# Patient Record
Sex: MALE | Race: WHITE | Employment: OTHER | URBAN - METROPOLITAN AREA
[De-identification: names, ages, dates, MRNs, and addresses within clinical notes are randomized per-mention and may not be internally consistent; named-entity substitution may affect disease eponyms.]

---

## 2017-08-15 ENCOUNTER — HOSPITAL ENCOUNTER (OUTPATIENT)
Dept: PHYSICAL THERAPY | Facility: CLINIC | Age: 67
Setting detail: THERAPIES SERIES
End: 2017-08-15
Attending: ORTHOPAEDIC SURGERY
Payer: MEDICARE

## 2017-08-15 PROCEDURE — G8986 CARRY D/C STATUS: HCPCS | Mod: GP,CK | Performed by: PHYSICAL THERAPIST

## 2017-08-15 PROCEDURE — 97110 THERAPEUTIC EXERCISES: CPT | Mod: GP | Performed by: PHYSICAL THERAPIST

## 2017-08-15 PROCEDURE — G8985 CARRY GOAL STATUS: HCPCS | Mod: GP,CI | Performed by: PHYSICAL THERAPIST

## 2017-08-15 PROCEDURE — 40000718 ZZHC STATISTIC PT DEPARTMENT ORTHO VISIT: Performed by: PHYSICAL THERAPIST

## 2017-08-15 PROCEDURE — 97161 PT EVAL LOW COMPLEX 20 MIN: CPT | Mod: GP | Performed by: PHYSICAL THERAPIST

## 2017-08-15 PROCEDURE — G8984 CARRY CURRENT STATUS: HCPCS | Mod: GP,CK | Performed by: PHYSICAL THERAPIST

## 2017-08-15 NOTE — PROGRESS NOTES
[facility]          OUTPATIENT PHYSICAL THERAPY ORTHOPEDIC EVALUATION  PLAN OF TREATMENT FOR OUTPATIENT REHABILITATION  (COMPLETE FOR INITIAL CLAIMS ONLY)  Patient's Last Name, First Name, M.I.  YOB: 1950  Carlos Cormier    Provider s Name:  [facility]   Medical Record No.  [id number]   Start of Care Date:  08/15/17   Onset Date:  06/20/17   Type:     _X__PT   ___OT   ___SLP Medical Diagnosis:  Right glenohumeral joint arthritis     PT Diagnosis:  Right GH primary impingement    Visits from SOC:  1      _________________________________________________________________________________  Plan of Treatment/Functional Goals:  ADL retraining, joint mobilization, manual therapy, motor coordination training, neuromuscular re-education, ROM, strengthening, stretching     Cryotherapy     Goals     Goal Description: Following PT interventions, patient will be independent with his HEP to reduce future occurrence of pain and disability.  Target Date: 08/15/17       Goal Description: Following PT interventions, patient will have >+5/5 right GH ER strength to reduce impingement with overhead ADL's.  Target Date: 10/10/17       Goal Description: Following PT interventions, patient will be able to sleep for >=5 hours without waking due to right shoulder pain.  Target Date: 10/10/17      Therapy Frequency:  other (see comments) (1 visit for pt to learn HEP; chart to remain open x8 weeks)  Predicted Duration of Therapy Intervention:  8 weeks    Rita Hollingsworth, PT                 I CERTIFY THE NEED FOR THESE SERVICES FURNISHED UNDER        THIS PLAN OF TREATMENT AND WHILE UNDER MY CARE     (Physician co-signature of this document indicates review and certification of the therapy plan).                         Certification Date From:  08/15/17   Certification Date To:  10/10/17    Referring Provider:  Missael Melgar MD    Initial Assessment        See Epic Evaluation Start  of Care Date: 08/15/17

## 2017-08-15 NOTE — PROGRESS NOTES
" 08/15/17 0900   General Information   Type of Visit Initial OP Ortho PT Evaluation   Start of Care Date 08/15/17   Referring Physician Missael Melgar MD   Patient/Family Goals Statement To not have to have surgery; to get more active; to relieve the pain   Orders Evaluate and Treat   Date of Order 08/08/17   Insurance Type Medicare;Other   Insurance Comments/Visits Authorized [employer]; $1980 remaining in PT/SLP cap; no iontophoresis   Medical Diagnosis Right glenohumeral joint arthritis   Surgical/Medical history reviewed Yes   Precautions/Limitations no known precautions/limitations       Present No   Body Part(s)   Body Part(s) Shoulder   Presentation and Etiology   Pertinent history of current problem (include personal factors and/or comorbidities that impact the POC) Patient reports: had his right rotator cuff repaired 10-15 years ago.  Shoulder pain started flaring up in the past 2-3 months.  Thought it was the RTC flaring up again.  Had Xrays which revealed arthritis \"bone on bone.\"  Does feel some pain starting on his left shouder as well.  Has had cortisone shots that don't work anymore.  Hoping to use exercise to avoid surgery.    Impairments A. Pain;D. Decreased ROM;E. Decreased flexibility;F. Decreased strength and endurance;M. Locking or catching   Functional Limitations perform activities of daily living;perform desired leisure / sports activities   Symptom Location Right anterior shoulder   How/Where did it occur From Degenerative Joint Disease   Onset date of current episode/exacerbation 06/20/17   Chronicity Chronic   Pain rating (0-10 point scale) Best (/10);Worst (/10)   Best (/10) 4   Worst (/10) 9   Pain quality A. Sharp;C. Aching   Frequency of pain/symptoms C. With activity   Pain/symptoms are: Worse during the night  (Unable to sleep on side)   Pain/symptoms exacerbated by E. Rest;G. Certain positions;H. Overhead reach   Pain/symptoms eased by E. Changing positions "   Progression of symptoms since onset: Worsened   Prior Level of Function   Prior Level of Function-Mobility Independent   Prior Level of Function-ADLs Independent   Current Level of Function   Current Community Support Family/friend caregiver   Patient role/employment history F. Retired   Living environment [place]   Fall Risk Screen   Fall screen completed by PT   Per patient - Fall 2 or more times in past year? No   Per patient - Fall with injury in past year? No   Is patient a fall risk? No   Functional Scales   Functional Scales Other   Other Scales  SPADI=49.23% disability   Shoulder Objective Findings   Side (if bilateral, select both right and left) Right   Posture Hypertonic upper trap; keo rounded shoulders; increased thoracic kyphosis   Palpation Hypertonic pec minor, upper trap keo   Accessory Motion/Joint Mobility Limited inferior glide right GH   Right Shoulder Flexion AROM Right=110 deg with anterior shoulder pain; left=160 deg   Right Shoulder ER AROM Right=40 deg; Left=60 deg   Right Shoulder IR AROM Right=L5; Left=T10   Right Shoulder Flexion Strength Right=3+/5; Left=4/5   Right Shoulder Abduction Strength Right=3+/5; Left=4/5   Right Shoulder ER Strength Right=3+/5; Left=4/5   Right Shoulder IR Strength Keo=4/5   Right Lower Trapezius Strength Keo=4/5   Planned Therapy Interventions   Planned Therapy Interventions ADL retraining;joint mobilization;manual therapy;motor coordination training;neuromuscular re-education;ROM;strengthening;stretching   Planned Modality Interventions   Planned Modality Interventions Cryotherapy   Clinical Impression   Criteria for Skilled Therapeutic Interventions Met yes, treatment indicated   PT Diagnosis Right GH primary impingement     Influenced by the following impairments Pain; weakness; limited range of motion   Functional limitations due to impairments Pain and difficulty sleeping on right side, difficulty reaching overhead, unable to perform desired gym  routine   Clinical Presentation Stable/Uncomplicated   Clinical Presentation Rationale (+) motivation   (-) chronicity; chronic RTC repair; pt's desire not to return to PT   Clinical Decision Making (Complexity) Low complexity   Therapy Frequency other (see comments)  (1 visit for pt to learn HEP; chart to remain open x8 weeks)   Predicted Duration of Therapy Intervention (days/wks) 8 weeks   Risk & Benefits of therapy have been explained Yes   Patient, Family & other staff in agreement with plan of care Yes   Clinical Impression Comments Anjum is a pleasant 65 yo male who presents today with chronic worsening right shoulder pain.   He presents with signs and symptoms of primary impingement.   Pt does not want to follow up with PT; instead expressed desire to work on his HEP independently.   Pt will benefit from skilled physical therapy to address the above impairments and functional limitations.   Education Assessment   Preferred Learning Style Listening;Demonstration;Pictures/video   Barriers to Learning No barriers   ORTHO GOALS   PT Ortho Eval Goals 1;2;3   Ortho Goal 1   Goal Description Following PT interventions, patient will be independent with his HEP to reduce future occurrence of pain and disability.   Target Date 08/15/17   Date Met 08/15/17   Ortho Goal 2   Goal Description Following PT interventions, patient will have >+5/5 right GH ER strength to reduce impingement with overhead ADL's.   Target Date 10/10/17   Ortho Goal 3   Goal Description Following PT interventions, patient will be able to sleep for >=5 hours without waking due to right shoulder pain.   Target Date 10/10/17   Total Evaluation Time   Total Evaluation Time 15 min   Therapy Certification   Certification date from 08/15/17   Certification date to 10/10/17   Medical Diagnosis Right glenohumeral joint arthritis       Rita Hollingsworth, PT, DPT  Doctor of Physical Therapy #[id number]  [facility]  [facility]  [phone]  [email or web address]

## 2017-08-18 NOTE — ADDENDUM NOTE
Encounter addended by: Missael Melgar MD on: 8/18/2017  1:59 PM<BR>     Actions taken: Cosign clinical note with attestation

## 2018-02-07 NOTE — ADDENDUM NOTE
Encounter addended by: Rita Terrazas, PT on: 2/7/2018  2:02 PM<BR>     Actions taken: Sign clinical note, Flowsheet accepted, Charge Capture section accepted, Episode resolved

## 2018-02-07 NOTE — PROGRESS NOTES
"Outpatient Physical Therapy Discharge Note     Patient: Carlos Cormier  : 1950    Beginning/End Dates of Reporting Period:  8/15/17 to 2018    Referring Provider: Missael Melgar MD    Therapy Diagnosis: Right GH primary impingement     Client Self Report: Patient reports: had his right rotator cuff repaired 10-15 years ago.  Shoulder pain started flaring up in the past 2-3 months.  Thought it was the RTC flaring up again.  Had Xrays which revealed arthritis \"bone on bone.\"  Does feel some pain starting on his left shouder as well.  Has had cortisone shots that don't work anymore.  Hoping to use exercise to avoid surgery.     Objective Measurements:  Objective Measure: GH AROM  Details: Evysfml=704 deg; ER=40 deg  Objective Measure: GH MMT  Details: ER=3+/5; flexion=3+/5          Goals:  Goal Identifier     Goal Description Following PT interventions, patient will be independent with his HEP to reduce future occurrence of pain and disability.   Target Date 08/15/17   Date Met  08/15/17   Progress:     Goal Identifier     Goal Description Following PT interventions, patient will have >+5/5 right GH ER strength to reduce impingement with overhead ADL's.   Target Date 10/10/17   Date Met      Progress:     Goal Identifier     Goal Description Following PT interventions, patient will be able to sleep for >=5 hours without waking due to right shoulder pain.   Target Date 10/10/17   Date Met      Progress:     Progress Toward Goals:   Progress this reporting period: Carlos attended 1 PT session to be instructed in the appropriate HEP to address his right shoulder pain.  Plan was for patient to return only if needed.  Patient did not schedule a follow up appointment; unable to assess progress.          Plan:  Discharge from therapy.    Discharge:    Reason for Discharge: Patient has failed to schedule further appointments.    Equipment Issued: Theraband    Discharge Plan: Patient to continue home " program.    Rita Terrazas, PT, DPT  Doctor of Physical Therapy #[id number]  [facility]  [phone]  [email or web address]

## 2021-03-24 ENCOUNTER — HOSPITAL ENCOUNTER (OUTPATIENT)
Dept: PHYSICAL THERAPY | Facility: CLINIC | Age: 71
Setting detail: THERAPIES SERIES
End: 2021-03-24
Attending: ORTHOPAEDIC SURGERY
Payer: COMMERCIAL

## 2021-03-24 PROCEDURE — 97110 THERAPEUTIC EXERCISES: CPT | Mod: GP | Performed by: PHYSICAL THERAPIST

## 2021-03-24 PROCEDURE — 97140 MANUAL THERAPY 1/> REGIONS: CPT | Mod: GP | Performed by: PHYSICAL THERAPIST

## 2021-03-24 PROCEDURE — 97162 PT EVAL MOD COMPLEX 30 MIN: CPT | Mod: GP | Performed by: PHYSICAL THERAPIST

## 2021-03-24 NOTE — PROGRESS NOTES
[facility]    OUTPATIENT PHYSICAL THERAPY ORTHOPEDIC EVALUATION  PLAN OF TREATMENT FOR OUTPATIENT REHABILITATION  (COMPLETE FOR INITIAL CLAIMS ONLY)  Patient's Last Name, First Name, M.I.  YOB: 1950  Carlos Cormier    Provider s Name:  [facility]   Medical Record No.  [id number]   Start of Care Date:  03/24/21   Onset Date:  03/02/21(Surgical Date. 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   Goals  Goal Identifier: 1  Goal Description: STG: Pt will be able to reach to a high shelf 4/10 on functional scale per SPADI.   Target Date: 05/03/21    Goal Identifier: 2  Goal Description: STG: Pt will be independent w/ putting on shoes/socks.   Target Date: 05/03/21    Goal Identifier: 3  Goal Description: STG: Pt will be able to remove something from his back pocket 4/10 on Funcitonal scale per SPADI.   Target Date: 05/03/21    Goal Identifier: 4  Goal Description: STG: Pt will be able to wash his hair 2/10 on SPADI scale.   Target Date: 05/10/21    Goal Identifier: 5  Goal Description: LTG: Pt will be independent w/HEP and self cares to manage post surgical impairements.   Target Date: 06/02/21    Therapy Frequency:  2 times/Week  Predicted Duration of Therapy Intervention:  6 weeks, decreasing frequency as appropriate, 12 visits.     Hazel Berry, PT, MTC                  I CERTIFY THE NEED FOR THESE SERVICES FURNISHED UNDER        THIS PLAN OF TREATMENT AND WHILE UNDER MY CARE .       Physician Signature               Date    X_____________________________________________________           Certification Date  From:  03/24/21   Certification Date To:  05/10/21    Referring Provider:  Prasad Springer     Initial Assessment        See Epic Evaluation Start of Care Date: 03/24/21

## 2021-03-24 NOTE — PROGRESS NOTES
Ortho Evaluation 03/24/21 0900   General Information   Type of Visit Initial OP Ortho PT Evaluation   Start of Care Date 03/24/21   Referring Physician Prasad Springer    Patient/Family Goals Statement Needs help w/socks, Wash back, hair, reaching upward to a shelf, Removing something from back pocket.    Orders Evaluate and Treat   Date of Order 03/16/21   Certification Required? 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 )   Precautions/Limitations   (Do not lift shoulder. )   Special Instructions Oxycodone, Vicaden. Suggested taking before Rx's.    General Information Comments Had sling, but has DC'd it.  Has follow up appt.    Body Part(s)   Body Part(s) Shoulder   Presentation and Etiology   Pertinent history of current problem (include personal factors and/or comorbidities that impact the POC) R RCR on R shoulder previously in about 2000, Was favoring in, but stared to get worse.  Started to get OA in R shoulder. So had to get Total Reverse shoulder d/t RC was pretty bad.    Impairments A. Pain;D. Decreased ROM;E. Decreased flexibility;F. Decreased strength and endurance   Functional Limitations perform required work activities;perform desired leisure / sports activities;perform activities of daily living   Symptom Location Pain R shoulder. Still black and blue to about the elbow.    How/Where did it occur From Degenerative Joint Disease;With repetition/overuse   Onset date of current episode/exacerbation 03/02/21  (Surgical Date. )   Chronicity New   Pain rating (0-10 point scale)   (5/10)   Pain quality A. Sharp;C. Aching   Frequency of pain/symptoms B. Intermittent   Pain/symptoms are: The same all the time   Pain/symptoms exacerbated by G. Certain positions;H. Overhead reach;J. ADL   Pain/symptoms eased by C. Rest;E. Changing positions;H. Cold;K. Other   Pain eased by comment Still has some  "oxycodone.    Progression of symptoms since onset: Improved   Prior Level of Function   Functional Level Prior Comment Currently independent w/ADL's but wife helps w/ socks.    Current Level of Function   Current Community Support Family/friend caregiver   Patient role/employment history F. Retired  ([employer] . )   Living environment [place]   Fall Risk Screen   Fall screen completed by PT   Have you fallen 2 or more times in the past year? No   Have you fallen and had an injury in the past year? No   Timed Up and Go score (seconds) No balance issues and walks quickly into dept.    Is patient a fall risk? No   Abuse Screen (yes response referral indicated)   Feels Unsafe at Home or Work/School no   Feels Threatened by Someone no   Does Anyone Try to Keep You From Having Contact with Others or Doing Things Outside Your Home? no   Physical Signs of Abuse Present no   System Outcome Measures   Outcome Measures   (SPADI  67.69)   Shoulder Objective Findings   Observation No acute distress.    Integumentary  Well healed 4\" scar, Bruising from R shoulder to elbow w/ stiff tissues.    Posture head forward   Shoulder ROM Comment PROM in supine. ER 0; 110; Abd 90; IR 65.    Palpation Tissue tightness in bruise over biceps area.    Planned Therapy Interventions   Planned Therapy Interventions Comment Develop HEP, STM, MT, PTRX# [id number]   Planned Modality Interventions   Planned Modality Interventions Comments If indicated.    Clinical Impression   Criteria for Skilled Therapeutic Interventions Met yes, treatment indicated   PT Diagnosis Limited use of R U/E d/t Total Reverse shoulder on 3/2/21    Influenced by the following impairments Pain, Decreased ROM, Strength.    Functional limitations due to impairments ADL's harder, HH duties    Clinical Presentation Evolving/Changing   Clinical Presentation Rationale SPADI, PROM, Bruising, Hx of High BP, Heart problems, RCR on same side. CTR B.    Clinical " Decision Making (Complexity) Moderate complexity   Therapy Frequency 2 times/Week   Predicted Duration of Therapy Intervention (days/wks) 6 weeks, decreasing frequency as appropriate, 12 visits.    Risk & Benefits of therapy have been explained Yes   Patient, Family & other staff in agreement with plan of care Yes   Clinical Impression Comments Limited use of R U/E d/t Total Reverse shoulder on 3/2/21    Education Assessment   Preferred Learning Style Listening;Demonstration;Pictures/video   Barriers to Learning Hearing   ORTHO GOALS   PT Ortho Eval Goals 1;2;3;4;5   Ortho Goal 1   Goal Identifier 1   Goal Description STG: Pt will be able to reach to a high shelf 4/10 on functional scale per SPADI.    Target Date 05/03/21   Ortho Goal 2   Goal Identifier 2   Goal Description STG: Pt will be independent w/ putting on shoes/socks.    Target Date 05/03/21   Ortho Goal 3   Goal Identifier 3   Goal Description STG: Pt will be able to remove something from his back pocket 4/10 on Funcitonal scale per SPADI.    Target Date 05/03/21   Ortho Goal 4   Goal Identifier 4   Goal Description STG: Pt will be able to wash his hair 2/10 on SPADI scale.    Target Date 05/10/21   Ortho Goal 5   Goal Identifier 5   Goal Description LTG: Pt will be independent w/HEP and self cares to manage post surgical impairements.    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

## 2021-03-29 ENCOUNTER — HOSPITAL ENCOUNTER (OUTPATIENT)
Dept: PHYSICAL THERAPY | Facility: CLINIC | Age: 71
Setting detail: THERAPIES SERIES
End: 2021-03-29
Attending: ORTHOPAEDIC SURGERY
Payer: COMMERCIAL

## 2021-03-29 PROCEDURE — 97140 MANUAL THERAPY 1/> REGIONS: CPT | Mod: GP | Performed by: PHYSICAL THERAPIST

## 2021-03-29 PROCEDURE — 97110 THERAPEUTIC EXERCISES: CPT | Mod: GP | Performed by: PHYSICAL THERAPIST

## 2021-03-31 ENCOUNTER — HOSPITAL ENCOUNTER (OUTPATIENT)
Dept: PHYSICAL THERAPY | Facility: CLINIC | Age: 71
Setting detail: THERAPIES SERIES
End: 2021-03-31
Attending: ORTHOPAEDIC SURGERY
Payer: COMMERCIAL

## 2021-03-31 PROCEDURE — 97110 THERAPEUTIC EXERCISES: CPT | Mod: GP | Performed by: PHYSICAL THERAPIST

## 2021-03-31 PROCEDURE — 97140 MANUAL THERAPY 1/> REGIONS: CPT | Mod: GP | Performed by: PHYSICAL THERAPIST

## 2021-04-07 ENCOUNTER — HOSPITAL ENCOUNTER (OUTPATIENT)
Dept: PHYSICAL THERAPY | Facility: CLINIC | Age: 71
Setting detail: THERAPIES SERIES
End: 2021-04-07
Attending: ORTHOPAEDIC SURGERY
Payer: COMMERCIAL

## 2021-04-07 PROCEDURE — 97140 MANUAL THERAPY 1/> REGIONS: CPT | Mod: GP | Performed by: PHYSICAL THERAPIST

## 2021-04-07 PROCEDURE — 97110 THERAPEUTIC EXERCISES: CPT | Mod: GP | Performed by: PHYSICAL THERAPIST

## 2021-04-07 NOTE — PROGRESS NOTES
"   PROGRESS NOTE FOR MD VISIT.  04/07/21 1300   Signing Clinician's Name / Credentials   Signing clinician's name / credentials Hazel Berry, PT, MTC    Session Number   Session Number 4/12    Authorization status [employer] - [employer]   Progress Note/Recertification   Progress Note Due Date 05/10/21   Recertification Due Date 05/10/21   Adult Goals   PT Ortho Eval Goals 1;2;3;4;5   Ortho Goal 1   Goal Identifier 1   Goal Description STG: Pt will be able to reach to a high shelf 4/10 on functional scale per SPADI.    Target Date 05/03/21   Ortho Goal 2   Goal Identifier 2   Goal Description STG: Pt will be independent w/ putting on shoes/socks.    Target Date 05/03/21   Ortho Goal 3   Goal Identifier 3   Goal Description STG: Pt will be able to remove something from his back pocket 4/10 on Funcitonal scale per SPADI.    Target Date 05/03/21   Ortho Goal 4   Goal Identifier 4   Goal Description STG: Pt will be able to wash his hair 2/10 on SPADI scale.    Target Date 05/10/21   Ortho Goal 5   Goal Identifier 5   Goal Description LTG: Pt will be independent w/HEP and self cares to manage post surgical impairements.    Target Date 06/02/21   Subjective Report   Subjective Report Sees MD next Tuesday.  \"Knawing\" Pain in R shoulder. Ha 2nd Covid shot yesterday, so joints all over are achey.  Pain Scale: 7/10 average.    Objective Measures   Objective Measures Objective Measure 1;Objective Measure 2;Objective Measure 3;Objective Measure 4   Objective Measure 1   Objective Measure SPADI    Details 67.69    Objective Measure 2   Objective Measure PROM in supine   Details 4/7/21  Flex 147,  INITIALLY:  ER 0; Flex 110; Abd 90; IR 65.    Objective Measure 3   Objective Measure Palpation    Details Bruising distal Biceps w/ dense tissue.    Objective Measure 4   Objective Measure Pulleys ROM.    Details 4/7/21 On Pulleys Flex 100.    Treatment Interventions   Interventions Therapeutic Procedure/Exercise;Manual Therapy "   Therapeutic Procedure/exercise   Therapeutic Procedures: strength, endurance, ROM, flexibillity minutes (57320) 15   Skilled Intervention ROM ex's.    Patient Response Discontinued ER w/ Wand d/t Protocol said no Rotations.    Treatment Detail UBE, No resist on R, just ROM x 3'.   Pulleys for Flex.    Progress Wall Crawl, UBE  PTRX# [id number]   Manual Therapy   Manual Therapy: Mobilization, MFR, MLD, friction massage minutes (41700) 15   Skilled Intervention ROM, Tissue tightness    Patient Response Felt looser after Rx.    Treatment Detail Chest wall stretch, Grade II mobs for inferior glide of R GH jt. PROM for flex plane in supine, STM to Bruised area of R upper arm over top of shoulder and pectorrals. .    Progress As needed.    Assessments Completed   Assessments Completed s/p Shoulder    Equipment Needs   Equipment Needs Pulleys    Plan   Homework [facility] - Auth'd   Home program PTRX# [id number]   Updates to plan of care MD Visit 4/13/21. Protocol rec'd after Eval. No IR/ER or Abd.    Plan for next session ROM, MT.    Comments   Comments Limited use of R U/E d/t Total Reverse shoulder on 3/2/21    Total Session Time   Timed Code Treatment Minutes 25   Total Treatment Time (sum of timed and untimed services) 25   AMBULATORY CLINICS ONLY-MEDICAL AND TREATMENT DIAGNOSIS   PT Diagnosis Limited use of R U/E d/t Total Reverse shoulder on 3/2/21    Hazel Berry, PT, MTC (#[id number])  [facility]           [phone]  Fax          [phone]  Appt #      [phone]

## 2021-04-21 ENCOUNTER — HOSPITAL ENCOUNTER (OUTPATIENT)
Dept: PHYSICAL THERAPY | Facility: CLINIC | Age: 71
Setting detail: THERAPIES SERIES
End: 2021-04-21
Attending: ORTHOPAEDIC SURGERY
Payer: COMMERCIAL

## 2021-04-21 PROCEDURE — 97110 THERAPEUTIC EXERCISES: CPT | Mod: GP | Performed by: PHYSICAL THERAPIST

## 2021-04-21 PROCEDURE — 97140 MANUAL THERAPY 1/> REGIONS: CPT | Mod: GP | Performed by: PHYSICAL THERAPIST

## 2021-04-26 ENCOUNTER — HOSPITAL ENCOUNTER (OUTPATIENT)
Dept: PHYSICAL THERAPY | Facility: CLINIC | Age: 71
Setting detail: THERAPIES SERIES
End: 2021-04-26
Attending: ORTHOPAEDIC SURGERY
Payer: COMMERCIAL

## 2021-04-26 PROCEDURE — 97140 MANUAL THERAPY 1/> REGIONS: CPT | Mod: GP | Performed by: PHYSICAL THERAPIST

## 2021-04-26 PROCEDURE — 97110 THERAPEUTIC EXERCISES: CPT | Mod: GP | Performed by: PHYSICAL THERAPIST

## 2021-04-29 ENCOUNTER — HOSPITAL ENCOUNTER (OUTPATIENT)
Dept: PHYSICAL THERAPY | Facility: CLINIC | Age: 71
Setting detail: THERAPIES SERIES
End: 2021-04-29
Attending: ORTHOPAEDIC SURGERY
Payer: COMMERCIAL

## 2021-04-29 PROCEDURE — 97110 THERAPEUTIC EXERCISES: CPT | Mod: GP | Performed by: PHYSICAL THERAPIST

## 2021-04-29 PROCEDURE — 97140 MANUAL THERAPY 1/> REGIONS: CPT | Mod: GP | Performed by: PHYSICAL THERAPIST

## 2021-05-03 ENCOUNTER — HOSPITAL ENCOUNTER (OUTPATIENT)
Dept: PHYSICAL THERAPY | Facility: CLINIC | Age: 71
Setting detail: THERAPIES SERIES
End: 2021-05-03
Attending: ORTHOPAEDIC SURGERY
Payer: COMMERCIAL

## 2021-05-03 PROCEDURE — 97110 THERAPEUTIC EXERCISES: CPT | Mod: GP | Performed by: PHYSICAL THERAPIST

## 2021-05-03 PROCEDURE — 97140 MANUAL THERAPY 1/> REGIONS: CPT | Mod: GP | Performed by: PHYSICAL THERAPIST

## 2021-05-06 ENCOUNTER — HOSPITAL ENCOUNTER (OUTPATIENT)
Dept: PHYSICAL THERAPY | Facility: CLINIC | Age: 71
Setting detail: THERAPIES SERIES
End: 2021-05-06
Attending: ORTHOPAEDIC SURGERY
Payer: COMMERCIAL

## 2021-05-06 PROCEDURE — 97140 MANUAL THERAPY 1/> REGIONS: CPT | Mod: GP | Performed by: PHYSICAL THERAPIST

## 2021-05-06 PROCEDURE — 97110 THERAPEUTIC EXERCISES: CPT | Mod: GP | Performed by: PHYSICAL THERAPIST

## 2021-05-11 ENCOUNTER — HOSPITAL ENCOUNTER (OUTPATIENT)
Dept: PHYSICAL THERAPY | Facility: CLINIC | Age: 71
Setting detail: THERAPIES SERIES
End: 2021-05-11
Attending: ORTHOPAEDIC SURGERY
Payer: COMMERCIAL

## 2021-05-11 PROCEDURE — 97110 THERAPEUTIC EXERCISES: CPT | Mod: GP | Performed by: PHYSICAL THERAPIST

## 2021-05-11 PROCEDURE — 97140 MANUAL THERAPY 1/> REGIONS: CPT | Mod: GP | Performed by: PHYSICAL THERAPIST

## 2021-05-17 ENCOUNTER — HOSPITAL ENCOUNTER (OUTPATIENT)
Dept: PHYSICAL THERAPY | Facility: CLINIC | Age: 71
Setting detail: THERAPIES SERIES
End: 2021-05-17
Attending: ORTHOPAEDIC SURGERY
Payer: COMMERCIAL

## 2021-05-17 PROCEDURE — 97140 MANUAL THERAPY 1/> REGIONS: CPT | Mod: GP | Performed by: PHYSICAL THERAPIST

## 2021-05-17 PROCEDURE — 97110 THERAPEUTIC EXERCISES: CPT | Mod: GP | Performed by: PHYSICAL THERAPIST

## 2021-05-17 NOTE — PROGRESS NOTES
Outpatient Physical Therapy Progress Note     Patient: Carlos Cormier  : 1950    Beginning/End Dates of Reporting Period:  3/24/21 to 2021.  Total # of Rx sessions: 10/12     Referring Provider: Prasad Springer Diagnosis: s/p R Reverse Total Shoulder on 3/2/21      Client Self Report: Did some lifting of some dirt yesterday and it got sore.  Still a little sore today.  Exercises going well at home, rates pain at 2-4/10. Some soreness at night still. Thinks this will be his last 2 appointments. Sees MD on 21.     Objective Measurements:  Objective Measure: SPADI   Details: 21  Score 35.38.  INITIALLY:  67.69     Objective Measure: PROM in supine  Details: 21  Flex 129,  Abd 140, ER 27, IR.  21  Flex 129, Abd 126,  ER 10, IR 61. 21  R Flex 147,  INITIALLY:  ER 0; Flex 110; Abd 90; IR 65.     Objective Measure: Palpation   Details: 21  Bruising and tight tissue resolved in Biceps. INITIALlY  Bruising distal Biceps w/ dense tissue.     Objective Measure: Pulleys ROM.   Details: 21  On Pulleys Flex 116.  21 On Pulleys Flex 100.      Goals:  Goal Identifier 1   Goal Description STG: Pt will be able to reach to a high shelf 4/10 on functional scale per SPADI.  21  MET    Target Date 21   Date Met  21   Progress:     Goal Identifier 2   Goal Description STG: Pt will be independent w/ putting on shoes/socks.  21  MET    Target Date 21   Date Met  21   Progress:     Goal Identifier 3   Goal Description STG: Pt will be able to remove something from his back pocket 4/10 on Funcitonal scale per SPADI.  21  NOT MET    Target Date 21   Date Met      Progress:     Goal Identifier 4   Goal Description STG: Pt will be able to wash his hair 2/10 on SPADI scale.  21  MET    Target Date 05/10/21   Date Met  21   Progress:     Goal Identifier 5   Goal Description LTG: Pt will be independent w/HEP and self cares to  manage post surgical impairements.  5/17/21  Partially met.    Target Date 06/02/21   Date Met      Progress:     Progress Toward Goals:   Progress this reporting period: Pt has met 3/4 STG's.   Progress limited due to Tight ROM, and initially patient using shoulder at home.     Plan:  Continue therapy per current plan of care.  Per protocol.     Discharge:  No    RECERTIFICATION    Carlos Cormier  1950  MRN: [id number]    Session Number: 10/12  since start of care.    Diagnosis: s/p R Reverse Total Shoulder   Onset Date: 3/2/21   Start of Care: 3/24/21    Reasons for Continuing Treatment:   Have not started heavier strengthening.  Will await further orders from MD     Frequency/Duration  1 times per week for 8 weeks for a total of 9 visits.    Recertification Period  5/17/21 - 7/14/21     Physician Signature:    Date:    X_______________________________________________________    Physician Name: Prasad Springer     I certify the need for these services furnished under this plan of treatment and while under my care. Physician co-signature of this document indicates review and certification of the therapy plan.  This signature may be written on paper, or electronically signed within [facility].     Hazel Berry, PT, [facility] (#[id number])  [facility]           [phone]  Fax          [phone]  Appt #      [phone]

## 2021-05-20 ENCOUNTER — HOSPITAL ENCOUNTER (OUTPATIENT)
Dept: PHYSICAL THERAPY | Facility: CLINIC | Age: 71
Setting detail: THERAPIES SERIES
End: 2021-05-20
Attending: ORTHOPAEDIC SURGERY
Payer: COMMERCIAL

## 2021-05-20 PROCEDURE — 97140 MANUAL THERAPY 1/> REGIONS: CPT | Mod: GP | Performed by: PHYSICAL THERAPIST

## 2021-05-20 PROCEDURE — 97110 THERAPEUTIC EXERCISES: CPT | Mod: GP | Performed by: PHYSICAL THERAPIST

## 2021-07-29 NOTE — PROGRESS NOTES
Outpatient Physical Therapy Discharge Note     Patient: Carlos Cormier  : 1950    Beginning/End Dates of Reporting Period:  3/24/21 to 21 when last seen. Discharge written on 21. Total # of Rx sessions:     Referring Provider: Prasad Springer Diagnosis: s/p R Reverse Total Shoulder on 3/2/21      Client Self Report: See Surgeon on Tuesday. MD Visit 21.  Pain only when do too much.  Aches at night.     Objective Measurements:  Objective Measure: SPADI   Details: 21  Score 35.38.  INITIALLY:  67.69     Objective Measure: PROM in supine  Details: 21  Flex 129,  Abd 140, ER 27, IR.  21  Flex 129, Abd 126,  ER 10, IR 61. 21  R Flex 147,  INITIALLY:  ER 0; Flex 110; Abd 90; IR 65.     Objective Measure: Palpation   Details: 21  Bruising and tight tissue resolved in Biceps. INITIALlY  Bruising distal Biceps w/ dense tissue.     Objective Measure: Pulleys ROM.   Details: 21  On Pulleys Flex 116.  21 On Pulleys Flex 100.      Goals:  Goal Identifier 1   Goal Description STG: Pt will be able to reach to a high shelf 4/10 on functional scale per SPADI.  21  MET    Target Date 21   Date Met  21   Progress (detail required for progress note):     Goal Identifier 2   Goal Description STG: Pt will be independent w/ putting on shoes/socks.  21  MET    Target Date 21   Date Met  21   Progress (detail required for progress note):     Goal Identifier 3   Goal Description STG: Pt will be able to remove something from his back pocket 4/10 on Funcitonal scale per SPADI.  21  NOT MET    Target Date 21   Date Met      Progress (detail required for progress note):     Goal Identifier 4   Goal Description STG: Pt will be able to wash his hair 2/10 on SPADI scale.  21  MET    Target Date 05/10/21   Date Met  21   Progress (detail required for progress note):     Goal Identifier 5   Goal Description LTG: Pt will be  independent w/HEP and self cares to manage post surgical impairements.  5/17/21  MET.    Target Date 06/02/21   Date Met  05/20/21   Progress (detail required for progress note):     Plan:  Discharge from therapy.    Discharge:    Reason for Discharge: Patient chooses to discontinue therapy.  Did not return after MD visit on 5/25/21.      Equipment Issued:      Discharge Plan: Patient to continue home program.  Pt to follow up w/MD as appropriate.   Hazel Berry, PT, [facility] (#[id number])  [facility]           [phone]  Fax          [phone]  Appt #      [phone]